# Patient Record
Sex: MALE | Race: WHITE | NOT HISPANIC OR LATINO | Employment: FULL TIME | URBAN - METROPOLITAN AREA
[De-identification: names, ages, dates, MRNs, and addresses within clinical notes are randomized per-mention and may not be internally consistent; named-entity substitution may affect disease eponyms.]

---

## 2020-02-26 ENCOUNTER — HOSPITAL ENCOUNTER (EMERGENCY)
Facility: CLINIC | Age: 25
Discharge: HOME | End: 2020-02-26
Attending: EMERGENCY MEDICINE | Admitting: EMERGENCY MEDICINE
Payer: OTHER MISCELLANEOUS

## 2020-02-26 VITALS
SYSTOLIC BLOOD PRESSURE: 136 MMHG | WEIGHT: 174.82 LBS | DIASTOLIC BLOOD PRESSURE: 79 MMHG | RESPIRATION RATE: 16 BRPM | TEMPERATURE: 98 F | OXYGEN SATURATION: 99 % | HEART RATE: 64 BPM

## 2020-02-26 DIAGNOSIS — S05.01XA ABRASION OF RIGHT CORNEA, INITIAL ENCOUNTER: ICD-10-CM

## 2020-02-26 DIAGNOSIS — H21.01 HYPHEMA OF RIGHT EYE: ICD-10-CM

## 2020-02-26 PROCEDURE — 99283 EMERGENCY DEPT VISIT LOW MDM: CPT

## 2020-02-26 RX ORDER — ERYTHROMYCIN 5 MG/G
0.5 OINTMENT OPHTHALMIC 4 TIMES DAILY
Qty: 1 G | Refills: 0 | Status: SHIPPED | OUTPATIENT
Start: 2020-02-26 | End: 2020-02-27

## 2020-02-26 RX ORDER — TETRACAINE HYDROCHLORIDE 5 MG/ML
SOLUTION OPHTHALMIC
Status: DISCONTINUED
Start: 2020-02-26 | End: 2020-02-26 | Stop reason: HOSPADM

## 2020-02-26 ASSESSMENT — ENCOUNTER SYMPTOMS
EYE PAIN: 1
PHOTOPHOBIA: 1
EYE REDNESS: 1

## 2020-02-26 NOTE — ED TRIAGE NOTES
Pt arrives to the ED due to right eye pain. Pt states he was washing a truck when the spray nozzle came back and hit him in the eye. Pt state having blurriness in eye but able to see how many fingers nurse is holding up correctly. Eye blood shot.

## 2020-02-26 NOTE — ED AVS SNAPSHOT
[facility] Emergency Department  [address]  [facility] [address]  Phone:  [phone]  Fax:  [phone]                                    Jan Bull   MRN: [id number]    Department:  [facility] Emergency Department   Date of Visit:  2/26/2020           After Visit Summary Signature Page    I have received my discharge instructions, and my questions have been answered. I have discussed any challenges I see with this plan with the nurse or doctor.    ..........................................................................................................................................  Patient/Patient Representative Signature      ..........................................................................................................................................  Patient Representative Print Name and Relationship to Patient    ..................................................               ................................................  Date                                   Time    ..........................................................................................................................................  Reviewed by Signature/Title    ...................................................              ..............................................  Date                                               Time          22EPIC Rev 08/18

## 2020-02-27 RX ORDER — ERYTHROMYCIN 5 MG/G
0.5 OINTMENT OPHTHALMIC 4 TIMES DAILY
Qty: 1 G | Refills: 0 | Status: SHIPPED | OUTPATIENT
Start: 2020-02-27

## 2020-02-27 ASSESSMENT — ENCOUNTER SYMPTOMS: HEADACHES: 0

## 2020-02-27 NOTE — ED PROVIDER NOTES
History     Chief Complaint:  Eye Pain    HPI   Jan Bull is a 25 year old male who presents for evaluation of right eye pain. The patient reports he was washing a truck with a  at 1530 when the metal nozzle of the hose hit his right eye. The patient reports right eye pain and redness since the incident and endorses complete loss of vision immediately after. He notes that his vision has been gradually improving, but he still endorses photophobia and was unable to read the lowest level of the eye chart in triage secondary to blurriness.    Allergies:  No Known Drug Allergies     Medications:    The patient is not currently taking any prescribed medications.     Past Medical History:    The patient denies any significant past medical history.     Past Surgical History:    The patient does not have any pertinent past surgical history.     Family History:    No past pertinent family history.     Social History:  The patient was unaccompanied to the ED.     Review of Systems   Eyes: Positive for photophobia, pain (right), redness and visual disturbance.   Neurological: Negative for headaches.   All other systems reviewed and are negative.      Physical Exam     Patient Vitals for the past 24 hrs:   BP Temp Temp src Pulse Heart Rate Resp SpO2 Weight   02/26/20 1723 136/79 98  F (36.7  C) Oral 64 64 16 99 % 79.3 kg (174 lb 13.2 oz)      Visual Assessment  Visual Acuity-Right 20/25   Visual Acuity-Left 20/20   Vision - Corrective Lenses None       Physical Exam  General: Patient is alert and interactive when I enter the room  Head:  The scalp, face, and head appear normal  Eyes:  Right conjunctiva injected.   Slit Lamp Exam:  Visual acuity (R): 20/25, (L): 20/20  Lids WNL  No foreign body noted in detailed exam upper and lower lids/margins  PERRL, EOMI.  Anterior Chamber: Trace hyphema. No hypopyon.   Cornea: Fluorescein staining: Uptake noted near midline overlying lower iris.  ENT:    The nose is  normal    Pinnae are normal  Neck:  Trachea midline  CV:  Normal rate  Resp:  No respiratory distress   Musc:  Normal muscular tone    No major joint effusions    No asymmetric leg swelling    Good capillary refill noted  Skin:  No rash or lesions noted  Neuro: Speech is normal and fluent. Face is symmetric. Moving all extremities well.   Psych:  Awake. Alert.  Normal affect.  Appropriate interactions.      Emergency Department Course     Emergency Department Course:   Past medical records, nursing notes, and vitals reviewed.  1832: I performed an exam of the patient and obtained history, as documented above.    I performed an exam of the patient's right eye.    Findings and plan explained to the Patient. Patient discharged home with instructions regarding supportive care, medications, and reasons to return. The importance of close follow-up was reviewed. The patient was prescribed erythromycin ophthalmic ointment.      Impression & Plan        Medical Decision Making:  Patient presents after blunt force injury to right eye.  He has minimal visual acuity deficit.  His eye is injected and he does have a corneal abrasion.  We will treat this with erythromycin ointment.  Patient also appears to have a very small hyphema.  Given his improvement in his visual acuity, no visual field defects, very mild hyphema, I believe patient can be safely discharged at this time with outpatient ophthalmology appointment tomorrow.  He was given referral information for both University as well as local eye clinic.  He was instructed to follow-up without fail.  He was further instructed to sleep sitting up tonight and to avoid any NSAIDs or salicylates. He is in stable condition at the time of discharge, indications for return to the ED were discussed as well as follow up. All questions were answered and he is in agreement with the plan.      Diagnosis:    ICD-10-CM    1. Abrasion of right cornea, initial encounter S05.01XA    2. Hyphema  of right eye H21.01        Disposition:  discharged to home    Discharge Medications:  Discharge Medication List as of 2/26/2020  6:59 PM      START taking these medications    Details   erythromycin (ROMYCIN) 5 MG/GM ophthalmic ointment Place 0.5 inches into the right eye 4 times daily for 5 daysDisp-1 g, R-0Local Print           I, Celia Collier, am serving as a scribe on 2/26/2020 at 6:28 PM to personally document services performed by Erik Walters MD based on my observations and the provider's statements to me.      Celia Collier  2/26/2020   [facility] EMERGENCY DEPARTMENT       Erik Walters MD  02/27/20 0143

## 2020-02-27 NOTE — DISCHARGE INSTRUCTIONS
Diagnosis: Small hyphema, corneal abrasion.  Plan: Follow-up with ophthalmology tomorrow without fail.   Eye ointment tonight and tomorrow morning.   Sleep sitting up tonight or in recliner. Don't lay flat on your back.   Avoid ibuprofen/motrin/advil, NSAIDs, and aspirin/salicylates  Return Precautions:   Worsening vision, pain, or for any other concerns.     Please read the remainder of your discharge instructions for more information.       Discharge Instructions  Corneal Abrasion    Today you were treated for a scratch on the cornea of your eye, or a corneal abrasion.  The cornea is the clear layer of tissue that covers the colored part of your eye. Corneal abrasions are caused when something scratches your eye such as fingernails, animal paws, branches, pieces of paper, tiny pieces of rust, wood, glass, plastic or contact lenses. Corneal abrasions often make people feel like there is a speck of sand in the eye.  These abrasions also can cause severe eye pain, watery eyes, blurred vision and pain with bright light.      Generally, every Emergency Department visit should have a follow-up clinic visit with either a primary or a specialty clinic/provider. Please follow-up as instructed by your emergency provider today.    Return to the Emergency Department if:  Your vision worsens.  The appearance of your eye concerns you.  Anything else concerns you.    Treatment:  Tylenol  (acetaminophen), Motrin  (ibuprofen), or Advil  (ibuprofen) will help with the pain from the abrasion.    Use the antibiotic eye ointment or drops as directed until the antibiotics are finished.  Do not wear contacts until the antibiotic is finished.  Do not patch your eye, because this can increase your risk for infection.  Your symptoms should improve gradually over the next 2 days.  If they are not improving, it is very important that you see an eye provider right away.  If over the next few days, the pain is getting worse, you have  increasing difficulty with vision or you have yellow drainage from your eye, you need to see the eye provider that day.  If you have difficulty getting in to see an eye provider, please return to an Urgent Care or Emergency Department for further evaluation and treatment.    If you were given a prescription for medicine here today, be sure to read all of the information (including the package insert) that comes with your prescription.  This will include important information about the medicine, its side effects, and any warnings that you need to know about.  The pharmacist who fills the prescription can provide more information and answer questions you may have about the medicine.  If you have questions or concerns that the pharmacist cannot address, please call or return to the Emergency Department.   Remember that you can always come back to the Emergency Department if you are not able to see your regular provider in the amount of time listed above, if you get any new symptoms, or if there is anything that worries you.

## 2022-06-08 ENCOUNTER — HOSPITAL ENCOUNTER (EMERGENCY)
Facility: CLINIC | Age: 27
Discharge: HOME | End: 2022-06-08
Attending: EMERGENCY MEDICINE | Admitting: EMERGENCY MEDICINE
Payer: COMMERCIAL

## 2022-06-08 VITALS
RESPIRATION RATE: 20 BRPM | OXYGEN SATURATION: 94 % | SYSTOLIC BLOOD PRESSURE: 136 MMHG | HEART RATE: 73 BPM | TEMPERATURE: 97.6 F | DIASTOLIC BLOOD PRESSURE: 67 MMHG

## 2022-06-08 DIAGNOSIS — R40.4 TRANSIENT ALTERATION OF AWARENESS: ICD-10-CM

## 2022-06-08 DIAGNOSIS — F10.920 ACUTE ALCOHOLIC INTOXICATION WITHOUT COMPLICATION (H): ICD-10-CM

## 2022-06-08 LAB
ALBUMIN SERPL-MCNC: 4.4 G/DL (ref 3.4–5)
ALP SERPL-CCNC: 108 U/L (ref 40–150)
ALT SERPL W P-5'-P-CCNC: 52 U/L (ref 0–70)
ANION GAP SERPL CALCULATED.3IONS-SCNC: 5 MMOL/L (ref 3–14)
AST SERPL W P-5'-P-CCNC: 38 U/L (ref 0–45)
ATRIAL RATE - MUSE: 77 BPM
BASOPHILS # BLD AUTO: 0.1 10E3/UL (ref 0–0.2)
BASOPHILS NFR BLD AUTO: 1 %
BILIRUB SERPL-MCNC: 0.3 MG/DL (ref 0.2–1.3)
BUN SERPL-MCNC: 10 MG/DL (ref 7–30)
CALCIUM SERPL-MCNC: 8.8 MG/DL (ref 8.5–10.1)
CHLORIDE BLD-SCNC: 109 MMOL/L (ref 94–109)
CO2 SERPL-SCNC: 26 MMOL/L (ref 20–32)
CREAT SERPL-MCNC: 0.66 MG/DL (ref 0.66–1.25)
DIASTOLIC BLOOD PRESSURE - MUSE: NORMAL MMHG
EOSINOPHIL # BLD AUTO: 0.3 10E3/UL (ref 0–0.7)
EOSINOPHIL NFR BLD AUTO: 4 %
ERYTHROCYTE [DISTWIDTH] IN BLOOD BY AUTOMATED COUNT: 12.1 % (ref 10–15)
ETHANOL SERPL-MCNC: 0.22 G/DL
GFR SERPL CREATININE-BSD FRML MDRD: >90 ML/MIN/1.73M2
GLUCOSE BLD-MCNC: 117 MG/DL (ref 70–99)
HCT VFR BLD AUTO: 46.4 % (ref 40–53)
HGB BLD-MCNC: 16.4 G/DL (ref 13.3–17.7)
IMM GRANULOCYTES # BLD: 0 10E3/UL
IMM GRANULOCYTES NFR BLD: 0 %
INTERPRETATION ECG - MUSE: NORMAL
LYMPHOCYTES # BLD AUTO: 2 10E3/UL (ref 0.8–5.3)
LYMPHOCYTES NFR BLD AUTO: 30 %
MCH RBC QN AUTO: 30.6 PG (ref 26.5–33)
MCHC RBC AUTO-ENTMCNC: 35.3 G/DL (ref 31.5–36.5)
MCV RBC AUTO: 87 FL (ref 78–100)
MONOCYTES # BLD AUTO: 0.6 10E3/UL (ref 0–1.3)
MONOCYTES NFR BLD AUTO: 9 %
NEUTROPHILS # BLD AUTO: 3.6 10E3/UL (ref 1.6–8.3)
NEUTROPHILS NFR BLD AUTO: 56 %
NRBC # BLD AUTO: 0 10E3/UL
NRBC BLD AUTO-RTO: 0 /100
P AXIS - MUSE: 58 DEGREES
PLATELET # BLD AUTO: 358 10E3/UL (ref 150–450)
POTASSIUM BLD-SCNC: 3.8 MMOL/L (ref 3.4–5.3)
PR INTERVAL - MUSE: 158 MS
PROT SERPL-MCNC: 7.8 G/DL (ref 6.8–8.8)
QRS DURATION - MUSE: 96 MS
QT - MUSE: 368 MS
QTC - MUSE: 416 MS
R AXIS - MUSE: 48 DEGREES
RBC # BLD AUTO: 5.36 10E6/UL (ref 4.4–5.9)
SODIUM SERPL-SCNC: 140 MMOL/L (ref 133–144)
SYSTOLIC BLOOD PRESSURE - MUSE: NORMAL MMHG
T AXIS - MUSE: 12 DEGREES
VENTRICULAR RATE- MUSE: 77 BPM
WBC # BLD AUTO: 6.6 10E3/UL (ref 4–11)

## 2022-06-08 PROCEDURE — 36415 COLL VENOUS BLD VENIPUNCTURE: CPT | Performed by: EMERGENCY MEDICINE

## 2022-06-08 PROCEDURE — 258N000003 HC RX IP 258 OP 636: Performed by: EMERGENCY MEDICINE

## 2022-06-08 PROCEDURE — 82077 ASSAY SPEC XCP UR&BREATH IA: CPT | Performed by: EMERGENCY MEDICINE

## 2022-06-08 PROCEDURE — 96360 HYDRATION IV INFUSION INIT: CPT

## 2022-06-08 PROCEDURE — 93005 ELECTROCARDIOGRAM TRACING: CPT

## 2022-06-08 PROCEDURE — 99284 EMERGENCY DEPT VISIT MOD MDM: CPT | Mod: 25

## 2022-06-08 PROCEDURE — 80053 COMPREHEN METABOLIC PANEL: CPT | Performed by: EMERGENCY MEDICINE

## 2022-06-08 PROCEDURE — 85018 HEMOGLOBIN: CPT | Performed by: EMERGENCY MEDICINE

## 2022-06-08 RX ORDER — SODIUM CHLORIDE 9 MG/ML
INJECTION, SOLUTION INTRAVENOUS CONTINUOUS
Status: DISCONTINUED | OUTPATIENT
Start: 2022-06-08 | End: 2022-06-08 | Stop reason: HOSPADM

## 2022-06-08 RX ADMIN — SODIUM CHLORIDE 1000 ML: 9 INJECTION, SOLUTION INTRAVENOUS at 02:47

## 2022-06-08 ASSESSMENT — ENCOUNTER SYMPTOMS
TREMORS: 1
ABDOMINAL PAIN: 0
NAUSEA: 0
VOMITING: 0
SPEECH DIFFICULTY: 1

## 2022-06-08 NOTE — ED PROVIDER NOTES
"  History   Chief Complaint:  Alcohol Intoxication       The history is provided by the patient.      Jan Bull is a 27 year old male who presents with alcohol intoxication. The patient's wife reports that the patient consumed a high amount of alcohol this evening, causing him to pass out on their bed. She later found him shaking and incoherent, seeming as though he was going to vomit. This caused her concern, prompting her to contact EMS. The patient notes that he consumed about 4 beers and half a bottle of rum tonight because he was \"just having fun.\" He has a history of alcohol abuse, consuming an average of 4 beers per day. He does not use drugs or tobacco. He denies chest pain, suicidal ideation, nausea, vomiting, or abdominal pain. He is not on any medications.    Review of Systems   Cardiovascular: Negative for chest pain.   Gastrointestinal: Negative for abdominal pain, nausea and vomiting.   Neurological: Positive for tremors and speech difficulty.   Psychiatric/Behavioral: Negative for suicidal ideas.   All other systems reviewed and are negative.    Allergies:  No known drug allergies    Medications:  The patient is not currently taking any prescribed medications.    Past Medical History:     No past medical history.    Social History:  The patient presents to the ED via EMS with his wife.  Binge drinking alcohol use.  Denies other recreational drug use.    Physical Exam     Patient Vitals for the past 24 hrs:   BP Temp Temp src Pulse Resp SpO2   06/08/22 0320 -- -- -- -- -- 94 %   06/08/22 0210 136/67 97.6  F (36.4  C) Oral 73 20 96 %       Physical Exam  General: Adult male sitting upright  Eyes: PERRL, Conjunctive within normal limits.  No scleral icterus.  ENT: Moist mucous membranes, oropharynx clear.   CV: Normal S1S2, no murmur, rub or gallop. Regular rate and rhythm  Resp: Clear to auscultation bilaterally, no wheezes, rales or rhonchi. Normal respiratory effort.  GI: Abdomen is soft, " nontender and nondistended. No palpable masses. No rebound or guarding.  MSK: No edema. Nontender. Normal active range of motion.  Skin: Warm and dry. No rashes or lesions or ecchymoses on visible skin.  Neuro: Alert and oriented.  Mild slurring of speech.  Responds appropriately to all questions and commands. No focal findings appreciated. Normal muscle tone.  Psych: Normal mood and affect. Pleasant.    Emergency Department Course   ECG  ECG results from 06/08/22   EKG 12-lead, tracing only     Value    Systolic Blood Pressure     Diastolic Blood Pressure     Ventricular Rate 77    Atrial Rate 77    ME Interval 158    QRS Duration 96        QTc 416    P Axis 58    R AXIS 48    T Axis 12    Interpretation ECG      Sinus rhythm with sinus arrhythmia  Normal ECG  No previous ECGs available       Laboratory:  Labs Ordered and Resulted from Time of ED Arrival to Time of ED Departure   COMPREHENSIVE METABOLIC PANEL - Abnormal       Result Value    Sodium 140      Potassium 3.8      Chloride 109      Carbon Dioxide (CO2) 26      Anion Gap 5      Urea Nitrogen 10      Creatinine 0.66      Calcium 8.8      Glucose 117 (*)     Alkaline Phosphatase 108      AST 38      ALT 52      Protein Total 7.8      Albumin 4.4      Bilirubin Total 0.3      GFR Estimate >90     ETHYL ALCOHOL LEVEL - Abnormal    Alcohol ethyl 0.22 (*)    CBC WITH PLATELETS AND DIFFERENTIAL    WBC Count 6.6      RBC Count 5.36      Hemoglobin 16.4      Hematocrit 46.4      MCV 87      MCH 30.6      MCHC 35.3      RDW 12.1      Platelet Count 358      % Neutrophils 56      % Lymphocytes 30      % Monocytes 9      % Eosinophils 4      % Basophils 1      % Immature Granulocytes 0      NRBCs per 100 WBC 0      Absolute Neutrophils 3.6      Absolute Lymphocytes 2.0      Absolute Monocytes 0.6      Absolute Eosinophils 0.3      Absolute Basophils 0.1      Absolute Immature Granulocytes 0.0      Absolute NRBCs 0.0          Emergency Department Course:          Reviewed:  I reviewed nursing notes, vitals, past medical history and Care Everywhere    Assessments:  0229 I obtained history and examined the patient as noted above.   0320 I rechecked the patient and explained findings.  He has had no recurrence of events from earlier.  He is alert and appropriate.    Interventions:  0247 NS Bolus 1 L IV    Disposition:  The patient was discharged to home.     Impression & Plan     Medical Decision Making:  Jan Bull is a 27 year old male who presents for evaluation of alcohol abuse and acute intoxication with significant other concern for transient altered mental state, since cleared.  He is intoxicated here in ED clinically and by blood work.  Blood work otherwise looks ok; no signs of alcoholic ketoacidosis, significant liver impairment or acute alcoholic hepatitis. He has no history of DT's or alcohol withdrawal seizures.  By history seizure is unlikely, video visualized did not show evidence of seizure.  The dangers of alcohol abuse are discussed with the patient.  He does not feel he currently needs treatment.  He is with his sober wife will take him home.  This seems reasonable.  Recommended follow-up with PCP.  All questions answered prior to discharge.    Diagnosis:    ICD-10-CM    1. Acute alcoholic intoxication without complication (H)  F10.920    2. Transient alteration of awareness  R40.4        Scribe Disclosure:  I, Jey Clinton, am serving as a scribe at 2:22 AM on 6/8/2022 to document services personally performed by Katlyn Lemon MD based on my observations and the provider's statements to me.          Katlyn Lemon MD  06/08/22 [id number]

## 2022-06-08 NOTE — ED TRIAGE NOTES
"Patient arrives via EMS for evaluation of alcohol intoxication. Patient reports drinking about 6-8 beers prior to dinner this evening, and drank half of a bottle of rum following dinner. Wife found patient passed out in bedroom, thrashing on bed and incoherent. Wife unable to wake patient up and EMS called at this time. VS stable en route, patient nauseous but no emesis. . On arrival patient visibly intoxicated, denies any discomfort at this time. States \"I'm very healthy and theres nothing wrong with me\".      Triage Assessment     Row Name 06/08/22 0210       Triage Assessment (Adult)    Airway WDL WDL       Respiratory WDL    Respiratory WDL WDL       Skin Circulation/Temperature WDL    Skin Circulation/Temperature WDL WDL       Cardiac WDL    Cardiac WDL WDL       Peripheral/Neurovascular WDL    Peripheral Neurovascular WDL WDL       Cognitive/Neuro/Behavioral WDL    Cognitive/Neuro/Behavioral WDL X  Intoxicated              "